# Patient Record
Sex: MALE | Race: BLACK OR AFRICAN AMERICAN
[De-identification: names, ages, dates, MRNs, and addresses within clinical notes are randomized per-mention and may not be internally consistent; named-entity substitution may affect disease eponyms.]

---

## 2017-09-16 ENCOUNTER — HOSPITAL ENCOUNTER (EMERGENCY)
Dept: HOSPITAL 44 - ED | Age: 27
Discharge: HOME | End: 2017-09-16
Payer: SELF-PAY

## 2017-09-16 DIAGNOSIS — R30.0: Primary | ICD-10-CM

## 2017-09-16 DIAGNOSIS — N39.0: ICD-10-CM

## 2017-09-16 PROCEDURE — 99283 EMERGENCY DEPT VISIT LOW MDM: CPT

## 2017-09-16 PROCEDURE — 87491 CHLMYD TRACH DNA AMP PROBE: CPT

## 2017-09-16 PROCEDURE — 81002 URINALYSIS NONAUTO W/O SCOPE: CPT

## 2017-09-16 PROCEDURE — 87591 N.GONORRHOEAE DNA AMP PROB: CPT

## 2017-09-16 PROCEDURE — 96372 THER/PROPH/DIAG INJ SC/IM: CPT

## 2017-09-16 PROCEDURE — 87086 URINE CULTURE/COLONY COUNT: CPT

## 2017-09-16 NOTE — ED PHYSICIAN DOCUMENTATION
General Adult





- HISTORIAN


Historian: patient





- HPI


Stated Complaint: concern for STD


Chief Complaint: General Adult


Onset: days ago


Timing: still present


Severity: moderate


Further Comments: yes (Pt is a 26 yo male who has had dysruria, burning, and b/

l back pain.  Pt had discharge from penis last week, but this has gone away.  

No fever, n/v.)





- ROS


CONST: no problems


EYES/ENT: none


CVS/RESP: none


GI/: problems urinating


MS/SKIN/LYMPH: none





- PAST HX


Past History: none


Allergies/Adverse Reactions: 


 Allergies











Allergy/AdvReac Type Severity Reaction Status Date / Time


 


No Known Allergies Allergy   Verified 09/16/17 14:35














Home Medications: 


 Ambulatory Orders











 Medication  Instructions  Recorded


 


NK [NK]  09/16/17














- SOCIAL HX


Smoking History: non-smoker





- FAMILY HX


Family History: No





- REVIEWED ASSESSMENTS


Nursing Assessment  Reviewed: Yes


Vitals Reviewed: Yes





Progress





- Progress


Progress: 


Rocephin 1 gm IM


Azithromycin 1 gm po


Toradol 60 mg IM


 


GC/chlamydia lab - pending











Rx Ciprofloxacin 500 mg.  Take one every 12 hrs for 10 days.





General Adult Physical Exam





- PHYSICAL EXAM


GENERAL APPEARANCE: mild distress


EENT: pharynx normal


NECK: normal inspection, supple


RESPIRATORY: no resp distress, chest non-tender, breath sounds normal


CVS: reg rate & rhythm, heart sounds normal


ABDOMEN: soft, normal bowel sounds, non-tender


BACK: normal inspection, CVA tenderness (R), CVA tenderness (L)


SKIN: warm/dry, normal color


EXTREMITIES: non-tender, normal range of motion, no evidence of injury


NEURO: oriented X3, motor nml, sensation nml





Discharge


Clincal Impression: 


 dysuria, possible STD





UTI (urinary tract infection)


Qualifiers:


 Urinary tract infection type: site unspecified Hematuria presence: without 

hematuria Qualified Code(s): N39.0 - Urinary tract infection, site not specified





Referrals: 


Primary Doctor,No [Primary Care Provider] - 


Condition: Stable


Disposition: 01 HOME, SELF-CARE


Decision to Admit: NO


Decision Time: 14:37

## 2017-09-17 LAB
APPEARANCE UR: CLEAR
COLOR,URINE: YELLOW
PH UR STRIP: 7.5 [PH] (ref 5–8)
RBC UR QL: NEGATIVE
UROBILINOGEN URINE: 1 EU (ref 0.2–1)

## 2019-04-07 ENCOUNTER — HOSPITAL ENCOUNTER (EMERGENCY)
Dept: HOSPITAL 44 - ED | Age: 29
Discharge: HOME | End: 2019-04-07
Payer: SELF-PAY

## 2019-04-07 VITALS — DIASTOLIC BLOOD PRESSURE: 102 MMHG | SYSTOLIC BLOOD PRESSURE: 161 MMHG

## 2019-04-07 DIAGNOSIS — S61.011A: Primary | ICD-10-CM
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
## 2019-04-07 PROCEDURE — 99283 EMERGENCY DEPT VISIT LOW MDM: CPT

## 2019-04-07 PROCEDURE — 12002 RPR S/N/AX/GEN/TRNK2.6-7.5CM: CPT

## 2019-04-07 PROCEDURE — 99282 EMERGENCY DEPT VISIT SF MDM: CPT

## 2019-04-07 RX ADMIN — LIDOCAINE HYDROCHLORIDE ONE MG: 10 INJECTION, SOLUTION EPIDURAL; INFILTRATION; INTRACAUDAL; PERINEURAL at 19:25

## 2019-04-07 NOTE — ED PHYSICIAN DOCUMENTATION
General Adult





- HISTORIAN


Historian: patient





- HPI


Stated Complaint: Right Hand Laceration


Chief Complaint: Laceration/Recheck/Suture (Right Finger)


Additional Information: 


Patient is a 29-year-old male that presents to the ER with laceration at the 

carpometacarpal joint of the right hand (flap).  Patient states that he was 

doing dishes and accidentally cut the finger on a broken glass.  He also states 

that he was in skilled nursing and does not have his lisinopril- he was on 20 mg.  He is 

also asking for script for Clindamycin for his dental infection- he states that 

he took 2 days worth in skilled nursing but they did not give him the rest. 


Onset: hours


Timing: still present


Severity: mild


Modifying Factors: broken glass- doing dishes


Location: right carpalmetacarpal joint


Further Comments: no





- ROS


CONST: no problems


EYES/ENT: none


CVS/RESP: none


GI/: none


MS/SKIN/LYMPH: none


NEURO/PSYCH: denies: headache





- PAST HX


Past History: none


Other History: none


Surgeries/Procedures: none


Immunizations: tetanus, UTD


Allergies/Adverse Reactions: 


                                    Allergies











Allergy/AdvReac Type Severity Reaction Status Date / Time


 


Penicillins Allergy   Verified 04/07/19 19:30














Home Medications: 


                                Ambulatory Orders











 Medication  Instructions  Recorded


 


Clindamycin HCl 300 mg PO Q6H 10 Days #40 capsule 04/07/19


 


Lisinopril 20 mg PO DAILY #30 tablet 04/07/19














- SOCIAL HX


Smoking History: less than 1 pack/day


Alcohol Use: none


Drug Use: none





- FAMILY HX


Family History: No





- VITAL SIGNS


Vital Signs: 





                                   Vital Signs











Temp Pulse Resp BP Pulse Ox


 


    80   18   161/102   98 


 


    04/07/19 19:18  04/07/19 19:18  04/07/19 19:18  04/07/19 19:18














Procedures


Wound Location: upper extremity (FINGER LACERATION)


Wound Length: 5 cm


Wound's Depth, Shape: flap


Wound Explored: no foreign body removed


Irrigated w/ Saline (ccs): 250


Betadine Prep?: Yes


Anesthesia: 1% Lidocaine


Volume of Anesthetic: 5 ML


Wound Debrided: minimal


Wound Repaired With: sutures


Suture Size/Type: 4:0


Number of Sutures: 11


Layer Closure?: No


Splint Applied?: Yes


Type of Splint Applied: FINGER SPLINT AND DRESSING





ED Results Lab/Radiology





- Orders


Orders: 





                                    ED Orders











 Category Date Time Status


 


 Lidocaine 1% 5ml [Xylocaine] Med  04/07/19 19:25 Once





 50 mg IJ NOW ONE   














General Adult Physical Exam





- PHYSICAL EXAM


GENERAL APPEARANCE: mild distress


EENT: eye inspection normal, ENT inspection normal, pharynx normal, SUMIT


NECK: normal inspection


RESPIRATORY: breath sounds normal


CVS: heart sounds normal


ABDOMEN: soft, normal bowel sounds


SKIN: warm/dry, other (lac to the right finger)


EXTREMITIES: normal range of motion


NEURO: oriented X3, CN's nml as tested, motor nml, sensation nml, mood/affect 

nml, cognition normal





Discharge


Clincal Impression: 


 Finger laceration





Prescriptions: 


Clindamycin HCl 300 mg PO Q6H 10 Days #40 capsule


Lisinopril 20 mg PO DAILY #30 tablet


Referrals: 


Primary Doctor,No [Primary Care Provider] - 2 Days


Additional Instructions: 


Follow up with PCP in 7-10 days to have sutures removed


Monitor for signs and symptoms of infection; redness/swelling/drainage


Take antibiotic as directed; Clindaymcin 300 mg by mouth every 6 hours


Script for Lisinopril 20 mg by mouth daily (f/u with PCP for refill)





Condition: Good


Disposition: 01 HOME, SELF-CARE


Decision to Admit: NO


Decision Time: 20:10